# Patient Record
Sex: FEMALE
[De-identification: names, ages, dates, MRNs, and addresses within clinical notes are randomized per-mention and may not be internally consistent; named-entity substitution may affect disease eponyms.]

---

## 2023-08-14 ENCOUNTER — P2P PATIENT RECORD (OUTPATIENT)
Age: 38
End: 2023-08-14

## 2023-09-27 ENCOUNTER — OFFICE VISIT (OUTPATIENT)
Dept: URBAN - METROPOLITAN AREA CLINIC 96 | Facility: CLINIC | Age: 38
End: 2023-09-27

## 2023-10-04 ENCOUNTER — OFFICE VISIT (OUTPATIENT)
Dept: URBAN - METROPOLITAN AREA CLINIC 96 | Facility: CLINIC | Age: 38
End: 2023-10-04

## 2023-10-04 RX ORDER — TIZANIDINE 2 MG/1
TABLET ORAL
Qty: 60 TABLET | Status: ACTIVE | COMMUNITY

## 2023-10-04 RX ORDER — TRIAMCINOLONE ACETONIDE 1 MG/G
APPLY TO AFFECTED AREA TWICE TO THREE TIMES DAILY. DO NOT APPLY 4 HOURS BEFORE RADIATION CREAM TOPICAL
Qty: 80 GRAM | Refills: 0 | Status: ACTIVE | COMMUNITY

## 2023-10-04 NOTE — HPI-TODAY'S VISIT:
37 yo fem ref by Dr Arvizu for a gi consultation and a copy of this note will be sent to the ref provider.

## 2023-10-05 ENCOUNTER — OFFICE VISIT (OUTPATIENT)
Dept: URBAN - METROPOLITAN AREA CLINIC 92 | Facility: CLINIC | Age: 38
End: 2023-10-05

## 2023-10-06 ENCOUNTER — DASHBOARD ENCOUNTERS (OUTPATIENT)
Age: 38
End: 2023-10-06

## 2023-10-16 ENCOUNTER — TELEPHONE ENCOUNTER (OUTPATIENT)
Dept: URBAN - METROPOLITAN AREA CLINIC 96 | Facility: CLINIC | Age: 38
End: 2023-10-16

## 2023-10-16 ENCOUNTER — OFFICE VISIT (OUTPATIENT)
Dept: URBAN - METROPOLITAN AREA TELEHEALTH 2 | Facility: TELEHEALTH | Age: 38
End: 2023-10-16
Payer: COMMERCIAL

## 2023-10-16 VITALS — HEIGHT: 66 IN | BODY MASS INDEX: 25.39 KG/M2 | WEIGHT: 158 LBS

## 2023-10-16 DIAGNOSIS — Z92.89 HISTORY OF IMMUNOTHERAPY: ICD-10-CM

## 2023-10-16 DIAGNOSIS — K59.01 SLOW TRANSIT CONSTIPATION: ICD-10-CM

## 2023-10-16 DIAGNOSIS — K62.5 RECTAL BLEEDING: ICD-10-CM

## 2023-10-16 DIAGNOSIS — Z80.0 FAMILY HISTORY OF COLON CANCER: ICD-10-CM

## 2023-10-16 PROBLEM — 35298007: Status: ACTIVE | Noted: 2023-10-16

## 2023-10-16 PROBLEM — 429087003: Status: ACTIVE | Noted: 2023-10-16

## 2023-10-16 PROCEDURE — 99244 OFF/OP CNSLTJ NEW/EST MOD 40: CPT | Performed by: INTERNAL MEDICINE

## 2023-10-16 PROCEDURE — 99204 OFFICE O/P NEW MOD 45 MIN: CPT | Performed by: INTERNAL MEDICINE

## 2023-10-16 RX ORDER — TRIAMCINOLONE ACETONIDE 1 MG/G
APPLY TO AFFECTED AREA TWICE TO THREE TIMES DAILY. DO NOT APPLY 4 HOURS BEFORE RADIATION CREAM TOPICAL
Qty: 80 GRAM | Refills: 0 | Status: ON HOLD | COMMUNITY

## 2023-10-16 RX ORDER — POLYETHYLENE GLYCOL 3350, SODIUM SULFATE, SODIUM CHLORIDE, POTASSIUM CHLORIDE, ASCORBIC ACID, SODIUM ASCORBATE 140-9-5.2G
1000 ML KIT ORAL ONCE
Qty: 1 | Refills: 0 | OUTPATIENT
Start: 2023-10-16 | End: 2023-10-17

## 2023-10-16 RX ORDER — TIZANIDINE 2 MG/1
TABLET ORAL
Qty: 60 TABLET | Status: ACTIVE | COMMUNITY

## 2023-10-16 NOTE — HPI-TODAY'S VISIT:
referred by Sangita Garcia for rectal bleeding copy of the note sent to referring MD kenny of breast cancer, diagnosed in 1/2023. 5/2023 double mastectomy stage 3, found on her own, 33 weeks pregnant with a surrogate delivered at 36 weeks, and then started chemotherapy the next week MGM uncles x 2 with CRC currently having bloody stools increasing blood with daily blood in stool hx of constipation, takes colace daily hx of hemorrhoids increase in blood quantity, started a couple months ago has seen in past with constipation norm is BM QOD chemo caused a lot of diarrhea started radiation in august x 16 sessions currently, herceptin/perjeta every three weeks thru port. just completed antibiotics for a scalp lesion---bactrim for abscess on scalp or sebaceous dermatitis,inflamed by chemo  in april was hospitalized for giardia, was having severe diarrhea/fevers up to 104

## 2023-11-21 ENCOUNTER — OFFICE VISIT (OUTPATIENT)
Dept: URBAN - METROPOLITAN AREA MEDICAL CENTER 28 | Facility: MEDICAL CENTER | Age: 38
End: 2023-11-21
Payer: COMMERCIAL

## 2023-11-21 DIAGNOSIS — K92.1 ACUTE MELENA: ICD-10-CM

## 2023-11-21 PROCEDURE — 45378 DIAGNOSTIC COLONOSCOPY: CPT | Performed by: INTERNAL MEDICINE

## 2023-11-21 RX ORDER — TIZANIDINE 2 MG/1
TABLET ORAL
Qty: 60 TABLET | Status: ACTIVE | COMMUNITY

## 2023-11-21 RX ORDER — TRIAMCINOLONE ACETONIDE 1 MG/G
APPLY TO AFFECTED AREA TWICE TO THREE TIMES DAILY. DO NOT APPLY 4 HOURS BEFORE RADIATION CREAM TOPICAL
Qty: 80 GRAM | Refills: 0 | Status: ON HOLD | COMMUNITY

## 2023-11-28 ENCOUNTER — OFFICE VISIT (OUTPATIENT)
Dept: URBAN - METROPOLITAN AREA CLINIC 96 | Facility: CLINIC | Age: 38
End: 2023-11-28